# Patient Record
Sex: FEMALE | Race: BLACK OR AFRICAN AMERICAN | NOT HISPANIC OR LATINO | URBAN - METROPOLITAN AREA
[De-identification: names, ages, dates, MRNs, and addresses within clinical notes are randomized per-mention and may not be internally consistent; named-entity substitution may affect disease eponyms.]

---

## 2017-12-28 VITALS
DIASTOLIC BLOOD PRESSURE: 61 MMHG | TEMPERATURE: 99 F | OXYGEN SATURATION: 99 % | SYSTOLIC BLOOD PRESSURE: 136 MMHG | HEART RATE: 64 BPM | RESPIRATION RATE: 16 BRPM

## 2017-12-28 RX ORDER — CHLORHEXIDINE GLUCONATE 213 G/1000ML
1 SOLUTION TOPICAL ONCE
Qty: 0 | Refills: 0 | Status: DISCONTINUED | OUTPATIENT
Start: 2018-01-24 | End: 2018-01-25

## 2017-12-28 NOTE — H&P ADULT - PMH
Cataracts, bilateral    Dyslipidemia    Glaucoma    Hypertension    PVD (peripheral vascular disease)

## 2017-12-28 NOTE — H&P ADULT - HISTORY OF PRESENT ILLNESS
77 year old female with PMHx HTN, Dyslipidemia, PVD who presents c/o C/P at rest and with exertion.   Stress Echo 12/2017 revealed stress induced hypokinesia of the inferior and inferolateral wall suggestive of ischemia and poor exercise capacity. Resting Echo revealed normal LV size, normal LV wall motion, normal LVEF 55-60%, no resting wall motion abnormalities noted. 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     at rest and with exertion.     1-2/10 intermittent non-radiating chest discomfort lasting few minutes before resolving. relieved with belching.  SOB and fatigue with walking up hills. and climbing 1-2 flights of stairs. Patient reports intermittent dizziness and BLE ankle edema,but denies palpitations, diaphoresis, N/V, syncope, BLE ankle edema, PND or orthopnea.   Stress Echo 12/2017 revealed stress induced hypokinesia of the inferior and inferolateral wall suggestive of ischemia and poor exercise capacity. Resting Echo revealed normal LV size, normal LV wall motion, normal LVEF 55-60%, no resting wall motion abnormalities noted. Stress EKG revealed Sinus Tachycardia at 128 bpm (90% of maximum predicted HR), no ST or T wave changes suggestive of ischemia noted. Patient noted to have frequent PVCs and bigeminy at peak exercise, which resolved three minutes into recovery time. 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 C/P is described as 1-2/10 intermittent non-radiating chest discomfort at rest lasting few minutes before resolving (CCS Angina Class IV Symptoms). Upon further questioning patient admits to SOB and fatigue with walking up hills and climbing 1-2 flights of stairs relieved with rest. Patient reports intermittent dizziness and BLE ankle edema,but denies palpitations, diaphoresis, N/V, syncope, PND or orthopnea.   Stress Echo 12/2017 revealed stress induced hypokinesia of the inferior and inferolateral wall suggestive of ischemia and poor exercise capacity. Resting Echo revealed normal LV size, normal LV wall motion, normal LVEF 55-60%, no resting wall motion abnormalities noted. Stress EKG revealed Sinus Tachycardia at 128 bpm (90% of maximum predicted HR), no ST or T wave changes suggestive of ischemia noted. Patient noted to have frequent PVCs and bigeminy at peak exercise, which resolved three minutes into recovery time. In light of patient's risk factors, CCS Angina Class IV Symptoms and abnormal stress echo patient now presents for recommended cardiac cath with possible intervention. 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 C/P is described as 1-2/10 intermittent non-radiating chest discomfort at rest lasting few minutes before resolving (CCS Angina Class IV Symptoms). Upon further questioning patient admits to SOB and fatigue with walking up hills and climbing 1-2 flights of stairs relieved with rest. Patient reports intermittent dizziness and BLE ankle edema,but denies palpitations, diaphoresis, N/V, syncope, PND or orthopnea.   Stress Echo 12/2017 (per Dr. Delgado's office note) revealed stress induced hypokinesia of the inferior and inferolateral wall suggestive of ischemia and poor exercise capacity. Resting Echo revealed normal LV size, normal LV wall motion, normal LVEF 55-60%, no resting wall motion abnormalities noted. Stress EKG revealed Sinus Tachycardia at 128 bpm (90% of maximum predicted HR), no ST or T wave changes suggestive of ischemia noted. Patient noted to have frequent PVCs and bigeminy at peak exercise, which resolved three minutes into recovery time. In light of patient's risk factors, CCS Angina Class IV Symptoms and abnormal stress echo patient now presents for recommended cardiac cath with possible intervention. ***PT TO BRING IN ALL MEDS       77 y.o Female with FHx Early MI (Brother-30s) and Heart Transplant (Brother-30s) and PMHx HTN, Dyslipidemia, Gout, PVD s/p BLE stents who presented to cardiologist c/o C/P x 1-2 months. C/P is described as 1-2/10 intermittent non-radiating chest discomfort at rest lasting few minutes before resolving (CCS Angina Class IV Symptoms). Upon further questioning patient admits to SOB and fatigue with walking up hills and climbing 1-2 flights of stairs relieved with rest. Patient reports intermittent dizziness and BLE ankle edema,but denies palpitations, diaphoresis, N/V, syncope, PND or orthopnea.   Stress Echo 12/2017 (per Dr. Delgado's office note) revealed stress induced hypokinesia of the inferior and inferolateral wall suggestive of ischemia and poor exercise capacity. Resting Echo revealed normal LV size, normal LV wall motion, normal LVEF 55-60%, no resting wall motion abnormalities noted. Stress EKG revealed Sinus Tachycardia at 128 bpm (90% of maximum predicted HR), no ST or T wave changes suggestive of ischemia noted. Patient noted to have frequent PVCs and bigeminy at peak exercise, which resolved three minutes into recovery time. In light of patient's risk factors, CCS Angina Class IV Symptoms and abnormal stress echo patient now presents for recommended cardiac cath with possible intervention. 77 y.o Female with FHx Early MI (Brother-30s) and Heart Transplant (Brother-30s) and PMHx HTN, Dyslipidemia, Gout, PVD s/p BLE stents who presented to cardiologist c/o C/P x 1-2 months. C/P is described as 1-2/10 intermittent non-radiating chest discomfort at rest lasting few minutes before resolving (CCS Angina Class IV Symptoms). Upon further questioning patient admits to SOB and fatigue with walking up hills and climbing 1-2 flights of stairs relieved with rest. Patient reports intermittent dizziness and BLE ankle edema,but denies palpitations, diaphoresis, N/V, syncope, PND or orthopnea.   Stress Echo 12/2017 (per Dr. Delgado's office note) revealed stress induced hypokinesia of the inferior and inferolateral wall suggestive of ischemia and poor exercise capacity. Resting Echo revealed normal LV size, normal LV wall motion, normal LVEF 55-60%, no resting wall motion abnormalities noted. Stress EKG revealed Sinus Tachycardia at 128 bpm (90% of maximum predicted HR), no ST or T wave changes suggestive of ischemia noted. Patient noted to have frequent PVCs and bigeminy at peak exercise, which resolved three minutes into recovery time. In light of patient's risk factors, CCS Angina Class IV Symptoms and abnormal stress echo patient now presents for recommended cardiac cath with possible intervention.

## 2017-12-28 NOTE — H&P ADULT - PSH
History of ventral hernia repair    S/P bilateral breast lumpectomy  Benign  Status post glaucoma surgery

## 2017-12-28 NOTE — H&P ADULT - FAMILY HISTORY
Mother  Still living? No  Family history of myocardial infarction, Age at diagnosis: Age Unknown     Sibling  Still living? No  Family history of myocardial infarction, Age at diagnosis: 31-40

## 2017-12-28 NOTE — H&P ADULT - ASSESSMENT
77 y.o Female with FHx Early MI (Brother-30s) and Heart Transplant (Brother-30s) and PMHx HTN, Dyslipidemia, Gout, PVD s/p BLE stents who presented to cardiologist c/o CCS Class IV Anginal Symptoms in the setting of recent abnormal stress echo now referred for recommended Cardiac Catheterization with intervention if clinically indicated.    ASA  Mallampati    Risks & benefits of procedure and alternative therapy have been explained to the patient including but not limited to: allergic reaction, bleeding w/possible need for blood transfusion, infection, renal and vascular compromise, limb damage, arrhythmia, stroke, vessel dissection/perforation, Myocardial infarction, emergent CABG. Informed consent obtained and in chart. 77 y.o Female with FHx Early MI (Brother-30s) and Heart Transplant (Brother-30s) and PMHx HTN, Dyslipidemia, Gout, PVD s/p BLE stents who presented to cardiologist c/o CCS Class IV Anginal Symptoms in the setting of recent abnormal stress echo now referred for recommended Cardiac Catheterization with intervention if clinically indicated.    ASA II Mallampati II    Risks & benefits of procedure and alternative therapy have been explained to the patient including but not limited to: allergic reaction, bleeding w/possible need for blood transfusion, infection, renal and vascular compromise, limb damage, arrhythmia, stroke, vessel dissection/perforation, Myocardial infarction, emergent CABG. Informed consent obtained and in chart. 77 y.o Female with FHx Early MI (Brother-30s) and Heart Transplant (Brother-30s) and PMHx HTN, Dyslipidemia, Gout, PVD s/p BLE stents who presented to cardiologist c/o CCS Class IV Anginal Symptoms in the setting of recent abnormal stress echo now referred for recommended Cardiac Catheterization with intervention if clinically indicated.    ASA II Mallampati II      Risks & benefits of procedure and alternative therapy have been explained to the patient including but not limited to: allergic reaction, bleeding w/possible need for blood transfusion, infection, renal and vascular compromise, limb damage, arrhythmia, stroke, vessel dissection/perforation, Myocardial infarction, emergent CABG. Informed consent obtained and in chart. 77 y.o Female with FHx Early MI (Brother-30s) and Heart Transplant (Brother-30s) and PMHx HTN, Dyslipidemia, Gout, PVD s/p BLE stents who presented to cardiologist c/o CCS Class IV Anginal Symptoms in the setting of recent abnormal stress echo now referred for recommended Cardiac Catheterization with intervention if clinically indicated.    ASA II Mallampati II  WBC 12.1 on pre cath labs with no shift- pt afebrile with no infectious complaints    Risks & benefits of procedure and alternative therapy have been explained to the patient including but not limited to: allergic reaction, bleeding w/possible need for blood transfusion, infection, renal and vascular compromise, limb damage, arrhythmia, stroke, vessel dissection/perforation, Myocardial infarction, emergent CABG. Informed consent obtained and in chart. 77 y.o Female with FHx Early MI (Brother-30s) and Heart Transplant (Brother-30s) and PMHx HTN, Dyslipidemia, Gout, PVD s/p BLE stents who presented to cardiologist c/o CCS Class IV Anginal Symptoms in the setting of recent abnormal stress echo now referred for recommended Cardiac Catheterization with intervention if clinically indicated.    ASA II Mallampati II  WBC 12.1 on pre cath labs with no shift- pt afebrile with no infectious complaints  K+ 3.7 repleted pre cath with K-dur 40mEq PO x 1  ASA 325mg and Plavix 600mg given pre-procedure    Risks & benefits of procedure and alternative therapy have been explained to the patient including but not limited to: allergic reaction, bleeding w/possible need for blood transfusion, infection, renal and vascular compromise, limb damage, arrhythmia, stroke, vessel dissection/perforation, Myocardial infarction, emergent CABG. Informed consent obtained and in chart. 77 y.o Female with FHx Early MI (Brother-30s) and Heart Transplant (Brother-30s) and PMHx HTN, Dyslipidemia, Gout, PVD s/p BLE stents who presented to cardiologist c/o CCS Class IV Anginal Symptoms in the setting of recent abnormal stress echo now referred for recommended Cardiac Catheterization with intervention if clinically indicated.    ASA II Mallampati II  WBC 12.1 on pre cath labs with no shift- pt afebrile with no infectious complaints  K+ 3.7 repleted pre cath with K-dur 40mEq PO x 1  ASA 325mg and Plavix 600mg given pre-procedure  Of note: s/p K-dur/ASA/Plavix load- pt with 1 episode of non-bloody diarrhea self resolved as per nursing no further episodes- fellow Yared notified prior to procedure.    Risks & benefits of procedure and alternative therapy have been explained to the patient including but not limited to: allergic reaction, bleeding w/possible need for blood transfusion, infection, renal and vascular compromise, limb damage, arrhythmia, stroke, vessel dissection/perforation, Myocardial infarction, emergent CABG. Informed consent obtained and in chart.

## 2018-01-24 ENCOUNTER — INPATIENT (INPATIENT)
Facility: HOSPITAL | Age: 78
LOS: 0 days | Discharge: HOME | DRG: 247 | End: 2018-01-25
Attending: INTERNAL MEDICINE | Admitting: INTERNAL MEDICINE
Payer: MEDICARE

## 2018-01-24 DIAGNOSIS — Z98.83 FILTERING (VITREOUS) BLEB AFTER GLAUCOMA SURGERY STATUS: Chronic | ICD-10-CM

## 2018-01-24 DIAGNOSIS — Z98.890 OTHER SPECIFIED POSTPROCEDURAL STATES: Chronic | ICD-10-CM

## 2018-01-24 LAB
ALBUMIN SERPL ELPH-MCNC: 4.4 G/DL — SIGNIFICANT CHANGE UP (ref 3.3–5)
ALP SERPL-CCNC: 70 U/L — SIGNIFICANT CHANGE UP (ref 40–120)
ALT FLD-CCNC: 8 U/L — LOW (ref 10–45)
ANION GAP SERPL CALC-SCNC: 12 MMOL/L — SIGNIFICANT CHANGE UP (ref 5–17)
APTT BLD: 27.6 SEC — SIGNIFICANT CHANGE UP (ref 27.5–37.4)
AST SERPL-CCNC: 14 U/L — SIGNIFICANT CHANGE UP (ref 10–40)
BASOPHILS NFR BLD AUTO: 0.2 % — SIGNIFICANT CHANGE UP (ref 0–2)
BILIRUB DIRECT SERPL-MCNC: <0.2 MG/DL — SIGNIFICANT CHANGE UP (ref 0–0.2)
BILIRUB INDIRECT FLD-MCNC: >0.4 MG/DL — SIGNIFICANT CHANGE UP (ref 0.2–1)
BILIRUB SERPL-MCNC: 0.6 MG/DL — SIGNIFICANT CHANGE UP (ref 0.2–1.2)
BUN SERPL-MCNC: 20 MG/DL — SIGNIFICANT CHANGE UP (ref 7–23)
CALCIUM SERPL-MCNC: 10.6 MG/DL — HIGH (ref 8.4–10.5)
CHLORIDE SERPL-SCNC: 100 MMOL/L — SIGNIFICANT CHANGE UP (ref 96–108)
CHOLEST SERPL-MCNC: 248 MG/DL — HIGH (ref 10–199)
CK MB CFR SERPL CALC: 2.6 NG/ML — SIGNIFICANT CHANGE UP (ref 0–6.7)
CK SERPL-CCNC: 104 U/L — SIGNIFICANT CHANGE UP (ref 25–170)
CO2 SERPL-SCNC: 28 MMOL/L — SIGNIFICANT CHANGE UP (ref 22–31)
CREAT SERPL-MCNC: 0.97 MG/DL — SIGNIFICANT CHANGE UP (ref 0.5–1.3)
CRP SERPL-MCNC: 0.29 MG/DL — SIGNIFICANT CHANGE UP (ref 0–0.4)
EOSINOPHIL NFR BLD AUTO: 3.2 % — SIGNIFICANT CHANGE UP (ref 0–6)
GLUCOSE SERPL-MCNC: 102 MG/DL — HIGH (ref 70–99)
HBA1C BLD-MCNC: 4.9 % — SIGNIFICANT CHANGE UP (ref 4–5.6)
HCT VFR BLD CALC: 39.1 % — SIGNIFICANT CHANGE UP (ref 34.5–45)
HDLC SERPL-MCNC: 70 MG/DL — SIGNIFICANT CHANGE UP (ref 40–125)
HGB BLD-MCNC: 13.7 G/DL — SIGNIFICANT CHANGE UP (ref 11.5–15.5)
INR BLD: 0.93 — SIGNIFICANT CHANGE UP (ref 0.88–1.16)
LIPID PNL WITH DIRECT LDL SERPL: 143 MG/DL — HIGH
LYMPHOCYTES # BLD AUTO: 28.3 % — SIGNIFICANT CHANGE UP (ref 13–44)
MCHC RBC-ENTMCNC: 29.1 PG — SIGNIFICANT CHANGE UP (ref 27–34)
MCHC RBC-ENTMCNC: 35 G/DL — SIGNIFICANT CHANGE UP (ref 32–36)
MCV RBC AUTO: 83 FL — SIGNIFICANT CHANGE UP (ref 80–100)
MONOCYTES NFR BLD AUTO: 12 % — SIGNIFICANT CHANGE UP (ref 2–14)
NEUTROPHILS NFR BLD AUTO: 56.3 % — SIGNIFICANT CHANGE UP (ref 43–77)
PLATELET # BLD AUTO: 284 K/UL — SIGNIFICANT CHANGE UP (ref 150–400)
POTASSIUM SERPL-MCNC: 3.7 MMOL/L — SIGNIFICANT CHANGE UP (ref 3.5–5.3)
POTASSIUM SERPL-SCNC: 3.7 MMOL/L — SIGNIFICANT CHANGE UP (ref 3.5–5.3)
PROT SERPL-MCNC: 8.9 G/DL — HIGH (ref 6–8.3)
PROTHROM AB SERPL-ACNC: 10.3 SEC — SIGNIFICANT CHANGE UP (ref 9.8–12.7)
RBC # BLD: 4.71 M/UL — SIGNIFICANT CHANGE UP (ref 3.8–5.2)
RBC # FLD: 14.2 % — SIGNIFICANT CHANGE UP (ref 10.3–16.9)
SODIUM SERPL-SCNC: 140 MMOL/L — SIGNIFICANT CHANGE UP (ref 135–145)
TOTAL CHOLESTEROL/HDL RATIO MEASUREMENT: 3.5 RATIO — SIGNIFICANT CHANGE UP (ref 3.3–7.1)
TRIGL SERPL-MCNC: 173 MG/DL — HIGH (ref 10–149)
WBC # BLD: 12.1 K/UL — HIGH (ref 3.8–10.5)
WBC # FLD AUTO: 12.1 K/UL — HIGH (ref 3.8–10.5)

## 2018-01-24 PROCEDURE — 93454 CORONARY ARTERY ANGIO S&I: CPT | Mod: 26,XU

## 2018-01-24 PROCEDURE — 93010 ELECTROCARDIOGRAM REPORT: CPT

## 2018-01-24 PROCEDURE — 92928 PRQ TCAT PLMT NTRAC ST 1 LES: CPT | Mod: RC

## 2018-01-24 PROCEDURE — 93571 IV DOP VEL&/PRESS C FLO 1ST: CPT | Mod: 26,LD

## 2018-01-24 RX ORDER — CLOPIDOGREL BISULFATE 75 MG/1
75 TABLET, FILM COATED ORAL DAILY
Qty: 0 | Refills: 0 | Status: DISCONTINUED | OUTPATIENT
Start: 2018-01-25 | End: 2018-01-25

## 2018-01-24 RX ORDER — SIMVASTATIN 20 MG/1
40 TABLET, FILM COATED ORAL AT BEDTIME
Qty: 0 | Refills: 0 | Status: DISCONTINUED | OUTPATIENT
Start: 2018-01-24 | End: 2018-01-25

## 2018-01-24 RX ORDER — POTASSIUM CHLORIDE 20 MEQ
40 PACKET (EA) ORAL ONCE
Qty: 0 | Refills: 0 | Status: COMPLETED | OUTPATIENT
Start: 2018-01-24 | End: 2018-01-24

## 2018-01-24 RX ORDER — ASPIRIN/CALCIUM CARB/MAGNESIUM 324 MG
81 TABLET ORAL DAILY
Qty: 0 | Refills: 0 | Status: DISCONTINUED | OUTPATIENT
Start: 2018-01-25 | End: 2018-01-25

## 2018-01-24 RX ORDER — ALLOPURINOL 300 MG
100 TABLET ORAL DAILY
Qty: 0 | Refills: 0 | Status: DISCONTINUED | OUTPATIENT
Start: 2018-01-24 | End: 2018-01-25

## 2018-01-24 RX ORDER — SODIUM CHLORIDE 9 MG/ML
500 INJECTION INTRAMUSCULAR; INTRAVENOUS; SUBCUTANEOUS
Qty: 0 | Refills: 0 | Status: DISCONTINUED | OUTPATIENT
Start: 2018-01-24 | End: 2018-01-25

## 2018-01-24 RX ORDER — SODIUM CHLORIDE 9 MG/ML
500 INJECTION INTRAMUSCULAR; INTRAVENOUS; SUBCUTANEOUS
Qty: 0 | Refills: 0 | Status: DISCONTINUED | OUTPATIENT
Start: 2018-01-24 | End: 2018-01-24

## 2018-01-24 RX ORDER — METOPROLOL TARTRATE 50 MG
50 TABLET ORAL
Qty: 0 | Refills: 0 | Status: DISCONTINUED | OUTPATIENT
Start: 2018-01-24 | End: 2018-01-25

## 2018-01-24 RX ORDER — LISINOPRIL 2.5 MG/1
10 TABLET ORAL DAILY
Qty: 0 | Refills: 0 | Status: DISCONTINUED | OUTPATIENT
Start: 2018-01-25 | End: 2018-01-25

## 2018-01-24 RX ORDER — ASPIRIN/CALCIUM CARB/MAGNESIUM 324 MG
325 TABLET ORAL ONCE
Qty: 0 | Refills: 0 | Status: COMPLETED | OUTPATIENT
Start: 2018-01-24 | End: 2018-01-24

## 2018-01-24 RX ORDER — CILOSTAZOL 100 MG/1
50 TABLET ORAL
Qty: 0 | Refills: 0 | Status: DISCONTINUED | OUTPATIENT
Start: 2018-01-24 | End: 2018-01-25

## 2018-01-24 RX ORDER — AMLODIPINE BESYLATE 2.5 MG/1
5 TABLET ORAL DAILY
Qty: 0 | Refills: 0 | Status: DISCONTINUED | OUTPATIENT
Start: 2018-01-25 | End: 2018-01-25

## 2018-01-24 RX ORDER — CLOPIDOGREL BISULFATE 75 MG/1
600 TABLET, FILM COATED ORAL ONCE
Qty: 0 | Refills: 0 | Status: COMPLETED | OUTPATIENT
Start: 2018-01-24 | End: 2018-01-24

## 2018-01-24 RX ADMIN — SIMVASTATIN 40 MILLIGRAM(S): 20 TABLET, FILM COATED ORAL at 23:35

## 2018-01-24 RX ADMIN — Medication 325 MILLIGRAM(S): at 11:02

## 2018-01-24 RX ADMIN — CILOSTAZOL 50 MILLIGRAM(S): 100 TABLET ORAL at 23:35

## 2018-01-24 RX ADMIN — SODIUM CHLORIDE 75 MILLILITER(S): 9 INJECTION INTRAMUSCULAR; INTRAVENOUS; SUBCUTANEOUS at 11:01

## 2018-01-24 RX ADMIN — Medication 50 MILLIGRAM(S): at 23:35

## 2018-01-24 RX ADMIN — CLOPIDOGREL BISULFATE 600 MILLIGRAM(S): 75 TABLET, FILM COATED ORAL at 11:02

## 2018-01-24 RX ADMIN — Medication 40 MILLIEQUIVALENT(S): at 11:02

## 2018-01-25 VITALS
OXYGEN SATURATION: 99 % | DIASTOLIC BLOOD PRESSURE: 69 MMHG | HEART RATE: 80 BPM | RESPIRATION RATE: 16 BRPM | SYSTOLIC BLOOD PRESSURE: 158 MMHG

## 2018-01-25 LAB
ALBUMIN SERPL ELPH-MCNC: 3.7 G/DL — SIGNIFICANT CHANGE UP (ref 3.3–5)
ALP SERPL-CCNC: 57 U/L — SIGNIFICANT CHANGE UP (ref 40–120)
ALT FLD-CCNC: 7 U/L — LOW (ref 10–45)
ANION GAP SERPL CALC-SCNC: 11 MMOL/L — SIGNIFICANT CHANGE UP (ref 5–17)
AST SERPL-CCNC: 12 U/L — SIGNIFICANT CHANGE UP (ref 10–40)
BILIRUB SERPL-MCNC: 0.6 MG/DL — SIGNIFICANT CHANGE UP (ref 0.2–1.2)
BUN SERPL-MCNC: 19 MG/DL — SIGNIFICANT CHANGE UP (ref 7–23)
CALCIUM SERPL-MCNC: 9.7 MG/DL — SIGNIFICANT CHANGE UP (ref 8.4–10.5)
CHLORIDE SERPL-SCNC: 99 MMOL/L — SIGNIFICANT CHANGE UP (ref 96–108)
CO2 SERPL-SCNC: 27 MMOL/L — SIGNIFICANT CHANGE UP (ref 22–31)
CREAT SERPL-MCNC: 1.01 MG/DL — SIGNIFICANT CHANGE UP (ref 0.5–1.3)
GLUCOSE SERPL-MCNC: 94 MG/DL — SIGNIFICANT CHANGE UP (ref 70–99)
HCT VFR BLD CALC: 35.2 % — SIGNIFICANT CHANGE UP (ref 34.5–45)
HGB BLD-MCNC: 11.9 G/DL — SIGNIFICANT CHANGE UP (ref 11.5–15.5)
MAGNESIUM SERPL-MCNC: 1.9 MG/DL — SIGNIFICANT CHANGE UP (ref 1.6–2.6)
MCHC RBC-ENTMCNC: 28 PG — SIGNIFICANT CHANGE UP (ref 27–34)
MCHC RBC-ENTMCNC: 33.8 G/DL — SIGNIFICANT CHANGE UP (ref 32–36)
MCV RBC AUTO: 82.8 FL — SIGNIFICANT CHANGE UP (ref 80–100)
PLATELET # BLD AUTO: 277 K/UL — SIGNIFICANT CHANGE UP (ref 150–400)
POTASSIUM SERPL-MCNC: 4.4 MMOL/L — SIGNIFICANT CHANGE UP (ref 3.5–5.3)
POTASSIUM SERPL-SCNC: 4.4 MMOL/L — SIGNIFICANT CHANGE UP (ref 3.5–5.3)
PROT SERPL-MCNC: 7.3 G/DL — SIGNIFICANT CHANGE UP (ref 6–8.3)
RBC # BLD: 4.25 M/UL — SIGNIFICANT CHANGE UP (ref 3.8–5.2)
RBC # FLD: 14.3 % — SIGNIFICANT CHANGE UP (ref 10.3–16.9)
SODIUM SERPL-SCNC: 137 MMOL/L — SIGNIFICANT CHANGE UP (ref 135–145)
WBC # BLD: 12 K/UL — HIGH (ref 3.8–10.5)
WBC # FLD AUTO: 12 K/UL — HIGH (ref 3.8–10.5)

## 2018-01-25 PROCEDURE — 80053 COMPREHEN METABOLIC PANEL: CPT

## 2018-01-25 PROCEDURE — C1769: CPT

## 2018-01-25 PROCEDURE — 93005 ELECTROCARDIOGRAM TRACING: CPT

## 2018-01-25 PROCEDURE — C1725: CPT

## 2018-01-25 PROCEDURE — 82553 CREATINE MB FRACTION: CPT

## 2018-01-25 PROCEDURE — 85730 THROMBOPLASTIN TIME PARTIAL: CPT

## 2018-01-25 PROCEDURE — 85027 COMPLETE CBC AUTOMATED: CPT

## 2018-01-25 PROCEDURE — C1889: CPT

## 2018-01-25 PROCEDURE — 82550 ASSAY OF CK (CPK): CPT

## 2018-01-25 PROCEDURE — 83735 ASSAY OF MAGNESIUM: CPT

## 2018-01-25 PROCEDURE — 99238 HOSP IP/OBS DSCHRG MGMT 30/<: CPT

## 2018-01-25 PROCEDURE — 36415 COLL VENOUS BLD VENIPUNCTURE: CPT

## 2018-01-25 PROCEDURE — C1874: CPT

## 2018-01-25 PROCEDURE — C1887: CPT

## 2018-01-25 PROCEDURE — C1760: CPT

## 2018-01-25 PROCEDURE — C1894: CPT

## 2018-01-25 PROCEDURE — 80076 HEPATIC FUNCTION PANEL: CPT

## 2018-01-25 PROCEDURE — 83036 HEMOGLOBIN GLYCOSYLATED A1C: CPT

## 2018-01-25 PROCEDURE — 86140 C-REACTIVE PROTEIN: CPT

## 2018-01-25 PROCEDURE — 80048 BASIC METABOLIC PNL TOTAL CA: CPT

## 2018-01-25 PROCEDURE — 85025 COMPLETE CBC W/AUTO DIFF WBC: CPT

## 2018-01-25 PROCEDURE — 80061 LIPID PANEL: CPT

## 2018-01-25 PROCEDURE — 85610 PROTHROMBIN TIME: CPT

## 2018-01-25 RX ORDER — SIMVASTATIN 20 MG/1
1 TABLET, FILM COATED ORAL
Qty: 0 | Refills: 0 | COMMUNITY

## 2018-01-25 RX ORDER — ASPIRIN/CALCIUM CARB/MAGNESIUM 324 MG
1 TABLET ORAL
Qty: 90 | Refills: 2 | OUTPATIENT
Start: 2018-01-25 | End: 2018-10-21

## 2018-01-25 RX ORDER — ATORVASTATIN CALCIUM 80 MG/1
1 TABLET, FILM COATED ORAL
Qty: 30 | Refills: 2 | OUTPATIENT
Start: 2018-01-25 | End: 2018-04-24

## 2018-01-25 RX ORDER — ASPIRIN/CALCIUM CARB/MAGNESIUM 324 MG
1 TABLET ORAL
Qty: 0 | Refills: 0 | COMMUNITY

## 2018-01-25 RX ORDER — CLOPIDOGREL BISULFATE 75 MG/1
1 TABLET, FILM COATED ORAL
Qty: 30 | Refills: 11 | OUTPATIENT
Start: 2018-01-25 | End: 2019-01-19

## 2018-01-25 RX ADMIN — AMLODIPINE BESYLATE 5 MILLIGRAM(S): 2.5 TABLET ORAL at 06:59

## 2018-01-25 RX ADMIN — Medication 81 MILLIGRAM(S): at 12:43

## 2018-01-25 RX ADMIN — LISINOPRIL 10 MILLIGRAM(S): 2.5 TABLET ORAL at 06:59

## 2018-01-25 RX ADMIN — Medication 50 MILLIGRAM(S): at 06:59

## 2018-01-25 RX ADMIN — CLOPIDOGREL BISULFATE 75 MILLIGRAM(S): 75 TABLET, FILM COATED ORAL at 12:43

## 2018-01-25 RX ADMIN — Medication 100 MILLIGRAM(S): at 12:43

## 2018-01-25 RX ADMIN — CILOSTAZOL 50 MILLIGRAM(S): 100 TABLET ORAL at 06:59

## 2018-01-25 NOTE — CONSULT NOTE ADULT - SUBJECTIVE AND OBJECTIVE BOX
CHIEF COMPLAINT: CAD    HISTORY OF PRESENT ILLNESS:  76 yo Female with FHx Early MI (Brother-30s) and Heart Transplant (Brother-30s) and PMHx HTN, Dyslipidemia, Gout, PVD s/p BLE stents who presented to cardiologist c/o C/P x 1-2 months. C/P is described as 1-2/10 intermittent non-radiating chest discomfort at rest lasting few minutes before resolving (CCS Angina Class IV Symptoms). Upon further questioning patient admits to SOB and fatigue with walking up hills and climbing 1-2 flights of stairs relieved with rest. Patient reports intermittent dizziness and BLE ankle edema,but denies palpitations, diaphoresis, N/V, syncope, PND or orthopnea. Pt now s/p cardiac cath on 1/24/18 with KONRAD pRCA. FFR 0.78 mLAD. EF 55%. Patient was seen at the bedside to discuss prevention prior to discharge.     PAST MEDICAL & SURGICAL HISTORY:  Cataracts, bilateral  Glaucoma  PVD (peripheral vascular disease)  Dyslipidemia  Hypertension  Status post glaucoma surgery  History of ventral hernia repair  S/P bilateral breast lumpectomy: Benign    FAMILY HISTORY:   + brother with MI in his 30's and a brother with a heart transplant.     Allergies:   No Known Allergies      HOME MEDICATIONS:  · 	aspirin 81 mg oral tablet: 1 tab(s) orally once a day, Last Dose Taken:    · 	Metoprolol Tartrate 50 mg oral tablet: 1 tab(s) orally 2 times a day, Last Dose Taken:    · 	allopurinol 100 mg oral tablet: 1 tab(s) orally once a day, Last Dose Taken:    · 	simvastatin 40 mg oral tablet: 1 tab(s) orally once a day (at bedtime), Last Dose Taken:    · 	amlodipine-benazepril 5 mg-20 mg oral capsule: 1 cap(s) orally once a day, Last Dose Taken:    · 	chlorthalidone 25 mg oral tablet: 1 tab(s) orally once a day, Last Dose Taken:    · 	hydroCHLOROthiazide 12.5 mg oral capsule: orally every other day , Last Dose Taken:    · 	cilostazol 50 mg oral tablet: 1 tab(s) orally 2 times a day, Last Dose Taken:        PHYSICAL EXAM:  T(C): 37 (01-25-18 @ 09:23), Max: 37 (01-25-18 @ 09:23)  T(F): 98.6 (01-25-18 @ 09:23), Max: 98.6 (01-25-18 @ 09:23)  HR: 80 (01-25-18 @ 12:50) (65 - 82)  BP: 158/69 (01-25-18 @ 12:50) (109/54 - 158/69)  RR: 16 (01-25-18 @ 12:50) (16 - 16)  SpO2: 99% (01-25-18 @ 12:50) (98% - 99%)        LABS:	                        11.9   12.0  )-----------( 277      ( 25 Jan 2018 06:54 )             35.2     01-25    137  |  99  |  19  ----------------------------<  94  4.4   |  27  |  1.01    Ca    9.7      25 Jan 2018 06:54  Mg     1.9     01-25    TPro  7.3  /  Alb  3.7  /  TBili  0.6  /  DBili  x   /  AST  12  /  ALT  7<L>  /  AlkPhos  57  01-25    Cholesterol, Serum: 248 mg/dL (01-24 @ 10:05)  HDL Cholesterol, Serum: 70 mg/dL (01-24 @ 10:05)  Triglycerides, Serum: 173 mg/dL (01-24 @ 10:05)  Direct LDL: 143 mg/dL (01-24 @ 10:05)    C-Reactive Protein, Serum: 0.29 mg/dL (01-24 @ 10:05)    Hemoglobin A1C, Whole Blood: 4.9 % (01-24-18 @ 10:05)      10 "S" ASSESSMENT PLAN: SMOKING, SITTING, SUGAR, SALT, SOME FATS, SOCIAL, SLEEP, SIGNS, AND MEDS:  Tobacco usage: Denies.   Stress: Rates stress level as "pretty good".   ETOH: Denies.   Caffeine: herbal tea, 2-3 cups per day.   Hormone Replacement: Denies.   Sleep Disorder: + snores "when tired". She goes to bed at 9 and wakes up early at 3-4 am. She has not had a sleep study in the past.   Inflammatory Condition: Denies.   Activity Level: She walks to take her grandchild to  and to run errands. At least 16 blocks most days.  Current Diet: Breakfast) tea with toast and butter or eggs or grits with margarine. Lunch) leftovers from dinner, gloria, or chicken and noodles. Dinner) string beans/broccoli/mil greens, with chicken or fish and mashed potatoes or pasta. Snacks) cookies such as butter cookies or vanilla wafers.    Heart Failure: Denies signs or symptoms of congestion or history of CHF.  Myopathy with Statins: Denies.   GI/ Issues: She notes occasional constipation. She has had a colonoscopy in the past.   GYN: Patient said all of her children were born at term and she denies history of HTN while pregnant.     ASSESSMENT/RECOMMENDATIONS: 	  Summary: 76 yo Female with FHx Early MI (Brother-30s) and Heart Transplant (Brother-30s) and PMHx HTN, Dyslipidemia, Gout, PVD s/p BLE stents who presented to cardiologist c/o C/P x 1-2 months. C/P is described as 1-2/10 intermittent non-radiating chest discomfort at rest lasting few minutes before resolving (CCS Angina Class IV Symptoms). Upon further questioning patient admits to SOB and fatigue with walking up hills and climbing 1-2 flights of stairs relieved with rest. Patient reports intermittent dizziness and BLE ankle edema,but denies palpitations, diaphoresis, N/V, syncope, PND or orthopnea. Pt now s/p cardiac cath on 1/24/18 with KONRAD pRCA. FFR 0.78 mLAD. EF 55%. Patient was seen at the bedside to discuss prevention prior to discharge.     RECOMMENDATIONS:   Anti-platelet Therapy: DAPT per interventionalist recommendation asa/Pletal.   Lipid Therapy: Simvastatin was changed to Lipitor 80 mg/d. Monitoring LFT's 6-8 weeks after this change would likely benefit this patient.   Beta Blocker Therapy: Continue current therapy with metoprolol per your discretion.   ACE/ARB Therapy: Continue current therapy continue benazepril per your discretion.   Diet: Low-sodium, low-carbohydrate, Mediterranean diet. Written instruction on the Mediterranean diet was provided.   Exercise: 30-45 minutes most days of the week once cleared to do so by their referring cardiologist. Cardiac rehab might benefit this patient.   Smoking: This patient is a non-smoker.   Stress Management: Instruction on mindfulness meditation was provided.   Sleep: A sleep study might benefit this patient.     Thank you for the opportunity to see this patient. Please feel free to contact [facility] if there are any questions, or if you feel that your patient would benefit from continued follow-up visits with the Program.

## 2018-01-25 NOTE — DISCHARGE NOTE ADULT - MEDICATION SUMMARY - MEDICATIONS TO TAKE
I will START or STAY ON the medications listed below when I get home from the hospital:    Aspirin Enteric Coated 81 mg oral delayed release tablet  -- 1 tab(s) by mouth once a day   -- Indication: For Coronary artery disease    allopurinol 100 mg oral tablet  -- 1 tab(s) by mouth once a day  -- Indication: For Gout    atorvastatin 80 mg oral tablet  -- 1 tab(s) by mouth once a day (at bedtime)  -- Indication: For Coronary artery disease and dyslipidemia    amlodipine-benazepril 5 mg-20 mg oral capsule  -- 1 cap(s) by mouth once a day  -- Indication: For Hypertension    clopidogrel 75 mg oral tablet  -- 1 tab(s) by mouth once a day  -- Indication: For Coronary artery disease    Metoprolol Tartrate 50 mg oral tablet  -- 1 tab(s) by mouth 2 times a day  -- Indication: For Hypertension    chlorthalidone 25 mg oral tablet  -- 1 tab(s) by mouth once a day  -- Indication: For Coronary artery disease    cilostazol 50 mg oral tablet  -- 1 tab(s) by mouth 2 times a day  -- Indication: For PVD (peripheral vascular disease)

## 2018-01-25 NOTE — DISCHARGE NOTE ADULT - MEDICATION SUMMARY - MEDICATIONS TO STOP TAKING
I will STOP taking the medications listed below when I get home from the hospital:    simvastatin 40 mg oral tablet  -- 1 tab(s) by mouth once a day (at bedtime)    hydroCHLOROthiazide 12.5 mg oral capsule  -- orally every other day

## 2018-01-25 NOTE — DISCHARGE NOTE ADULT - PROVIDER TOKENS
FREE:[LAST:[Danny],FIRST:[Carlos],PHONE:[phone]],FAX:[phone]    -],ADDRESS:[[address] [place] [address]]]

## 2018-01-25 NOTE — DISCHARGE NOTE ADULT - CARE PLAN
Principal Discharge DX:	Coronary artery disease  Goal:	You have blockages in the blood flow to your heart. This is called Coronary Artery Disease. You had a cardiac catheterization and got a drug eluting stent to your proximal right coronary artery. You had a remaining blockage in your left anterior descending coronary artery that you will have to come back in 5 weeks to have another procedure and get more stents. It is VERY IMPORTANT that you take ASPIRIN mg once daily and PLAVIX (CLOPIDOGREL) 75mg once daily to keep your stent open. If you do not take them EVERY SINGLE DAY your stent can close and you can have a heart attack. Continue taking Simvastatin 40mg once daily at bedtime for cholesterol control.  Assessment and plan of treatment:	Right wrist and groin wound care: do not lift objects heavier than 5 lbs for 5 days. Observe for signs of bleeding including bleeding/sudden swelling to your right wrist/groin sites, new/worsening back pain, or numbness/tingling/pain/coolness to your right hand/wrist/forearm or right leg/foot/toes call your doctor and go to the nearest ED immediately and call your doctor. Follow up with Dr. Carlos Delgado in 1-2 weeks.  Secondary Diagnosis:	Hypertension  Goal:	Continue taking your blood pressure medications at home doses.  Secondary Diagnosis:	Dyslipidemia  Goal:	Continue taking Simvastatin 40mg once daily at bedtime for cholesterol control.  Secondary Diagnosis:	Glaucoma  Goal:	Continue taking medication for glaucoma Principal Discharge DX:	Coronary artery disease  Goal:	You have blockages in the blood flow to your heart. This is called Coronary Artery Disease. You had a cardiac catheterization and got a drug eluting stent to your proximal right coronary artery. You had a remaining blockage in your left anterior descending coronary artery that you will have to come back in 5 weeks to have another procedure and get more stents. It is VERY IMPORTANT that you take ASPIRIN mg once daily and PLAVIX (CLOPIDOGREL) 75mg once daily to keep your stent open. If you do not take them EVERY SINGLE DAY your stent can close and you can have a heart attack. STOP taking Simvastatin, you have been switched to Atorvastatin 80mg once daily at bedtime for cholesterol control and prevention of further atherosclerotic plaque formation in your arteries.  Assessment and plan of treatment:	Right wrist and groin wound care: do not lift objects heavier than 5 lbs for 5 days. Observe for signs of bleeding including bleeding/sudden swelling to your right wrist/groin sites, new/worsening back pain, or numbness/tingling/pain/coolness to your right hand/wrist/forearm or right leg/foot/toes call your doctor and go to the nearest ED immediately and call your doctor. Follow up with Dr. Carlos Delgado in 1-2 weeks.  Secondary Diagnosis:	Hypertension  Goal:	Continue taking your blood pressure medications at home doses.  Assessment and plan of treatment:	PLEASE STOP TAKING HYDROCHLOROTHIAZIDE.  Continue taking your home medication of Amlodipine-Benazepril 5-20mg once daily, Metoprolol 50mg twice daily, and Chlorthalidone 25mg once daily.  Secondary Diagnosis:	Dyslipidemia  Goal:	STOP taking SImvastatin 40mg daily; A new Prescription for Atorvastatin 80mg once daily at bedtime was sent to your pharmacy for cholesterol control.  Secondary Diagnosis:	Glaucoma  Goal:	Continue taking medication for glaucoma  Secondary Diagnosis:	PVD (peripheral vascular disease)  Assessment and plan of treatment:	Continue Cilostazol 50mg twice daily and resume regular walking program after 5days.

## 2018-01-25 NOTE — DISCHARGE NOTE ADULT - PLAN OF CARE
Continue taking Simvastatin 40mg once daily at bedtime for cholesterol control. Continue taking medication for glaucoma You have blockages in the blood flow to your heart. This is called Coronary Artery Disease. You had a cardiac catheterization and got a drug eluting stent to your proximal right coronary artery. You had a remaining blockage in your left anterior descending coronary artery that you will have to come back in 5 weeks to have another procedure and get more stents. It is VERY IMPORTANT that you take ASPIRIN mg once daily and PLAVIX (CLOPIDOGREL) 75mg once daily to keep your stent open. If you do not take them EVERY SINGLE DAY your stent can close and you can have a heart attack. Continue taking Simvastatin 40mg once daily at bedtime for cholesterol control. Right wrist and groin wound care: do not lift objects heavier than 5 lbs for 5 days. Observe for signs of bleeding including bleeding/sudden swelling to your right wrist/groin sites, new/worsening back pain, or numbness/tingling/pain/coolness to your right hand/wrist/forearm or right leg/foot/toes call your doctor and go to the nearest ED immediately and call your doctor. Follow up with Dr. Carlos Delgado in 1-2 weeks. Continue taking your blood pressure medications at home doses. You have blockages in the blood flow to your heart. This is called Coronary Artery Disease. You had a cardiac catheterization and got a drug eluting stent to your proximal right coronary artery. You had a remaining blockage in your left anterior descending coronary artery that you will have to come back in 5 weeks to have another procedure and get more stents. It is VERY IMPORTANT that you take ASPIRIN mg once daily and PLAVIX (CLOPIDOGREL) 75mg once daily to keep your stent open. If you do not take them EVERY SINGLE DAY your stent can close and you can have a heart attack. STOP taking Simvastatin, you have been switched to Atorvastatin 80mg once daily at bedtime for cholesterol control and prevention of further atherosclerotic plaque formation in your arteries. PLEASE STOP TAKING HYDROCHLOROTHIAZIDE.  Continue taking your home medication of Amlodipine-Benazepril 5-20mg once daily, Metoprolol 50mg twice daily, and Chlorthalidone 25mg once daily. STOP taking SImvastatin 40mg daily; A new Prescription for Atorvastatin 80mg once daily at bedtime was sent to your pharmacy for cholesterol control. Continue Cilostazol 50mg twice daily and resume regular walking program after 5days.

## 2018-01-25 NOTE — DISCHARGE NOTE ADULT - PATIENT PORTAL LINK FT
“You can access the [employer] Patient Portal, offered by [facility], by registering with the following website: [email or web address]”

## 2018-01-25 NOTE — DISCHARGE NOTE ADULT - HOSPITAL COURSE
78 yo Female with FHx Early MI (Brother-30s) and Heart Transplant (Brother-30s) and PMHx HTN, Dyslipidemia, Gout, PVD s/p BLE stents who presented to cardiologist c/o C/P x 1-2 months. C/P is described as 1-2/10 intermittent non-radiating chest discomfort at rest lasting few minutes before resolving (CCS Angina Class IV Symptoms). Upon further questioning patient admits to SOB and fatigue with walking up hills and climbing 1-2 flights of stairs relieved with rest. Patient reports intermittent dizziness and BLE ankle edema,but denies palpitations, diaphoresis, N/V, syncope, PND or orthopnea. Pt now s/p cardiac cath on 1/24/18 with KONRAD pRCA. FFR 0.78 mLAD. EF 55%. Right groin failed perclose, femstop placed for help with hemostasis.  at 530pm. Repeat 150s at 830pm. Femstop removed and pressure dressing applied. On 1/25/18 pt's labs stable. Vital signs stable.     Telemetry .....     Pt endorses...... Denies....  Pt stable for discharge as per Dr. Petre. Pt will follow up with Dr. Carlos Delgado in 1-2 weeks. Prescription for Plavix 75mg daily was sent to..... pharmacy. 76 yo Female with FHx Early MI (Brother-30s) and Heart Transplant (Brother-30s) and PMHx HTN, Dyslipidemia, Gout, PVD s/p BLE stents who presented to cardiologist c/o C/P x 1-2 months. C/P is described as 1-2/10 intermittent non-radiating chest discomfort at rest lasting few minutes before resolving (CCS Angina Class IV Symptoms). Upon further questioning patient admits to SOB and fatigue with walking up hills and climbing 1-2 flights of stairs relieved with rest. Patient reports intermittent dizziness and BLE ankle edema,but denies palpitations, diaphoresis, N/V, syncope, PND or orthopnea. Pt now s/p cardiac cath on 1/24/18 with KONRAD pRCA. FFR 0.78 mLAD. EF 55%. Right groin failed perclose, femstop placed for help with hemostasis. Femstop removed and pressure dressing applied. On 1/25/18 pt feels well, has ambulated and voided; all labs reviewed and are stable. Vital signs stable and no telemetry events. Rt groin and Rt Radial access sites soft without hematoma or bleed, no bruit, DP/PT pulses intact to baseline.      Pt stable for discharge as per Dr. Peter. Pt will follow up with Dr. Carlos Delgado in 1-2 weeks. Simvastatin changed to Atorvastatin 80mg qhs and HCTZ DC'd as pt already on Chlorthalidone.  Prescription for Plavix 75mg daily, Aspirin, and Atorvastatin was sent to pt's pharmacy.  All medications reviewed with pt, instructions signed.

## 2018-01-29 DIAGNOSIS — E78.5 HYPERLIPIDEMIA, UNSPECIFIED: ICD-10-CM

## 2018-01-29 DIAGNOSIS — I25.10 ATHEROSCLEROTIC HEART DISEASE OF NATIVE CORONARY ARTERY WITHOUT ANGINA PECTORIS: ICD-10-CM

## 2018-01-29 DIAGNOSIS — M10.9 GOUT, UNSPECIFIED: ICD-10-CM

## 2018-01-29 DIAGNOSIS — I10 ESSENTIAL (PRIMARY) HYPERTENSION: ICD-10-CM

## 2018-01-29 DIAGNOSIS — Z28.21 IMMUNIZATION NOT CARRIED OUT BECAUSE OF PATIENT REFUSAL: ICD-10-CM

## 2018-01-29 DIAGNOSIS — I73.9 PERIPHERAL VASCULAR DISEASE, UNSPECIFIED: ICD-10-CM

## 2018-01-29 DIAGNOSIS — Z79.82 LONG TERM (CURRENT) USE OF ASPIRIN: ICD-10-CM

## 2018-03-03 PROBLEM — H40.9 UNSPECIFIED GLAUCOMA: Chronic | Status: ACTIVE | Noted: 2017-12-28

## 2018-03-03 PROBLEM — E78.5 HYPERLIPIDEMIA, UNSPECIFIED: Chronic | Status: ACTIVE | Noted: 2017-12-28

## 2018-03-03 PROBLEM — I10 ESSENTIAL (PRIMARY) HYPERTENSION: Chronic | Status: ACTIVE | Noted: 2017-12-28

## 2018-03-03 PROBLEM — I73.9 PERIPHERAL VASCULAR DISEASE, UNSPECIFIED: Chronic | Status: ACTIVE | Noted: 2017-12-28

## 2018-03-03 PROBLEM — H26.9 UNSPECIFIED CATARACT: Chronic | Status: ACTIVE | Noted: 2017-12-28

## 2018-03-06 VITALS
RESPIRATION RATE: 18 BRPM | HEART RATE: 64 BPM | WEIGHT: 138.89 LBS | SYSTOLIC BLOOD PRESSURE: 154 MMHG | DIASTOLIC BLOOD PRESSURE: 71 MMHG | HEIGHT: 63 IN | TEMPERATURE: 98 F | OXYGEN SATURATION: 99 %

## 2018-03-06 NOTE — H&P ADULT - NSHPLABSRESULTS_GEN_ALL_CORE
13.0   9.6   )-----------( 268      ( 28 Mar 2018 13:11 )             38.7   PT/INR - ( 28 Mar 2018 13:11 )   PT: 10.8 sec;   INR: 0.97          PTT - ( 28 Mar 2018 13:11 )  PTT:29.7 sec   EKG: Sinus rhythm @ 64 BPm with 1st degree AV block, prolonged QT, LAE, LAD, Bifascicular Block, LVH, and Q waves in aVL 13.0   9.6   )-----------( 268      ( 28 Mar 2018 13:11 )             38.7   PT/INR - ( 28 Mar 2018 13:11 )   PT: 10.8 sec;   INR: 0.97          PTT - ( 28 Mar 2018 13:11 )  PTT:29.7 sec  03-28    142  |  104  |  16  ----------------------------<  86  4.1   |  28  |  0.87    Ca    10.1      28 Mar 2018 13:11    TPro  7.7  /  Alb  4.2  /  TBili  0.7  /  DBili  x   /  AST  15  /  ALT  15  /  AlkPhos  93  03-28       EKG: Sinus rhythm @ 64 BPm with 1st degree AV block, prolonged QT (pt on Pletal), LAE, LAD, Bifascicular Block, LVH, and Q waves in aVL

## 2018-03-06 NOTE — H&P ADULT - ASSESSMENT
Patient is a 76yo Female with FHx of Early MI (Brother-30s) and Heart Transplant (Brother-30s) and PMHx of HTN, HLD, Gout, PVD s/p BLE stents and CAD (most recent cath 1/24/18 @[facility] KONRAD x2 to proxRCA with residual midLAD lesion (FFR 0.78)) who presents for staged PCI of mLAD, in light of patient's risk factors, class IV anginal symptoms and known CAD with residual disease.      ASA: III   Mallampati: IV    Precath/consented  IVF NS @ 75 cc/hr  Pre-loaded at home with ASA 81 mg PO x 1 and Plavix 75 mg PO x 1 this AM. Pt reports strict compliance of ASA/Plavix. Patient is a 78yo Female with FHx of Early MI (Brother-30s) and Heart Transplant (Brother-30s) and PMHx of HTN, HLD, Gout, PVD s/p BLE stents and CAD (most recent cath 1/24/18 @[facility] KONRAD x2 to proxRCA with residual midLAD lesion (FFR 0.78) who presents for staged PCI of mLAD, in light of patient's risk factors, class IV anginal symptoms and known CAD with residual disease.      ASA: III   Mallampati: IV    Precath/consented  IVF NS @ 75 cc/hr  Pre-loaded at home with ASA 81 mg PO x 1 and Plavix 75 mg PO x 1 this AM. Pt reports strict compliance of ASA/Plavix.

## 2018-03-06 NOTE — H&P ADULT - HISTORY OF PRESENT ILLNESS
Patient is a 76yo F with FHx of Early MI (Brother-30s) and Heart Transplant (Brother-30s) and PMHx of HTN, HLD, Gout, PVD s/p BLE stents and CAD s/p PCI proxRCA 1/24/18 with residual SKELETON    Patient is a 78yo F with FHx of Early MI (Brother-30s) and Heart Transplant (Brother-30s) and PMHx of HTN, HLD, Gout, PVD s/p BLE stents and CAD s/p PCI proxRCA 1/24/18 with residual midLAD lesion (FFR 0.78) who presented to her cardiologist's office complaining of continued occasional chest discomfort. Patient denies fatigue, palpitations, PND, orthopnea, N/V, hematochezia, melena, LE edema, dizziness, syncope. Patient reports compliance with ASA/plavix.    In light of patient's risk factors, class III anginal symptoms and known CAD, patient is now referred for recommended staged PCI of mLAD. SKELETON    Patient is a 76yo F with FHx of Early MI (Brother-30s) and Heart Transplant (Brother-30s) and PMHx of HTN, HLD, Gout, PVD s/p BLE stents and CAD s/p PCI proxRCA 1/24/18 with residual midLAD lesion (FFR 0.78) who presented to her cardiologist's office complaining of continued occasional chest discomfort. Patient denies fatigue, palpitations, PND, orthopnea, N/V, hematochezia, melena, LE edema, dizziness, syncope. Patient reports compliance with ASA/plavix.    In light of patient's risk factors, class III anginal symptoms and known CAD, patient is now referred for recommended staged PCI of mLAD.    Cardiac Cath 1/24/18: KONRAD x 2 proxRCA, LM normal, midLAD 70% (FFR 0.78), LCX mild luminal irreg, right radial and right femoral access Confirm medications on arrival  ASA/Plavix as indicated.    Patient is a 78yo F extremely poor historian, with FHx of Early MI (Brother-30s) and Heart Transplant (Brother-30s) and PMHx of HTN, HLD, Gout, PVD s/p BLE stents and CAD (most recent cath 1/24/18 @[facility] KONRAD x2 to proxRCA with residual midLAD lesion (FFR 0.78)) who since her last procedure presented to her cardiologist's office complaining of continued occasional chest discomfort. She is unable to describe the quality of the pain and states that it occurs intermittently and occasionally at rest. Patient denies fatigue, palpitations, PND, orthopnea, N/V, hematochezia, melena, LE edema, dizziness, syncope. Patient reports compliance with ASA/plavix.  In light of patient's risk factors, class III anginal symptoms and known CAD, patient is now referred for recommended staged PCI of mLAD.    Cardiac Cath 1/24/18: KONRAD x 2 proxRCA, LM normal, midLAD 70% (FFR 0.78), LCX mild luminal irreg, right radial and right femoral access Patient is a 78yo F extremely poor historian, with FHx of Early MI (Brother-30s) and Heart Transplant (Brother-30s) and PMHx of HTN, HLD, Gout, PVD s/p BLE stents and CAD (most recent cath 1/24/18 @[facility] KONRAD x2 to proxRCA with residual midLAD lesion (FFR 0.78)) who since her last procedure presented to her cardiologist's office complaining of continued occasional chest discomfort. She is unable to describe the quality of the pain and states that it occurs intermittently and occasionally at rest. Patient denies fatigue, palpitations, PND, orthopnea, N/V, hematochezia, melena, LE edema, dizziness, syncope. Patient reports compliance with ASA/plavix.  Stress Echo 12/2017 (per Dr. Delgado's office note) revealed stress induced hypokinesia of the inferior and inferolateral wall suggestive of ischemia and poor exercise capacity. Resting Echo revealed normal LV size, normal LV wall motion, normal LVEF 55-60%, no resting wall motion abnormalities noted. Stress EKG revealed Sinus Tachycardia at 128 bpm (90% of maximum predicted HR), no ST or T wave changes suggestive of ischemia noted. Patient noted to have frequent PVCs and bigeminy at peak exercise, which resolved three minutes into recovery time.   In light of patient's risk factors, class IV anginal symptoms and known CAD with residual disease, patient is now referred for recommended staged PCI of mLAD.    Cardiac Cath 1/24/18: KONRAD x 2 proxRCA, LM normal, midLAD 70% (FFR 0.78), LCX mild luminal irreg, right radial and right femoral access Patient is a 78yo Female with FHx of Early MI (Brother-30s) and Heart Transplant (Brother-30s) and PMHx of HTN, HLD, Gout, PVD s/p BLE stents and CAD (most recent cath 1/24/18 @[facility] KONRAD x2 to proxRCA with residual midLAD lesion (FFR 0.78)) who since her last procedure presented to her cardiologist's office complaining of continued occasional chest discomfort. She is unable to describe the quality of the pain and states that it occurs intermittently and occasionally at rest. Patient denies fatigue, palpitations, PND, orthopnea, N/V, hematochezia, melena, LE edema, dizziness, syncope. Patient reports compliance with ASA/plavix.  Stress Echo 12/2017 (per Dr. Delgado's office note) revealed stress induced hypokinesia of the inferior and inferolateral wall suggestive of ischemia and poor exercise capacity. Resting Echo revealed normal LV size, normal LV wall motion, normal LVEF 55-60%, no resting wall motion abnormalities noted. Stress EKG revealed Sinus Tachycardia at 128 bpm (90% of maximum predicted HR), no ST or T wave changes suggestive of ischemia noted. Patient noted to have frequent PVCs and bigeminy at peak exercise, which resolved three minutes into recovery time.   In light of patient's risk factors, class IV anginal symptoms and known CAD with residual disease, patient is now referred for recommended staged PCI of mLAD.    Cardiac Cath 1/24/18: KONRAD x 2 proxRCA, LM normal, midLAD 70% (FFR 0.78), LCX mild luminal irreg, right radial and right femoral access Patient is a 76yo Female with FHx of Early MI (Brother-30s) and Heart Transplant (Brother-30s) and PMHx of HTN, HLD, Gout, PVD s/p BLE stents and CAD (most recent cath 1/24/18 @[facility] KONRAD x2 to proxRCA with residual midLAD lesion (FFR 0.78) who since her last procedure presented to her cardiologist's office complaining of continued occasional chest discomfort. She is unable to describe the quality of the pain and states that it occurs intermittently and occasionally at rest. Patient denies fatigue, palpitations, PND, orthopnea, N/V, hematochezia, melena, LE edema, dizziness, syncope. Patient reports compliance with ASA/plavix.  Stress Echo 12/2017 (per Dr. Delgado's office note) revealed stress induced hypokinesia of the inferior and inferolateral wall suggestive of ischemia and poor exercise capacity. Resting Echo revealed normal LV size, normal LV wall motion, normal LVEF 55-60%, no resting wall motion abnormalities noted. Stress EKG revealed Sinus Tachycardia at 128 bpm (90% of maximum predicted HR), no ST or T wave changes suggestive of ischemia noted. Patient noted to have frequent PVCs and bigeminy at peak exercise, which resolved three minutes into recovery time.   In light of patient's risk factors, class IV anginal symptoms and known CAD with residual disease, patient is now referred for recommended staged PCI of mLAD.    Cardiac Cath 1/24/18: KONRAD x 2 proxRCA, LM normal, midLAD 70% (FFR 0.78), LCX mild luminal irreg, right radial and right femoral access

## 2018-03-28 ENCOUNTER — INPATIENT (INPATIENT)
Facility: HOSPITAL | Age: 78
LOS: 0 days | Discharge: HOME | DRG: 247 | End: 2018-03-29
Attending: INTERNAL MEDICINE | Admitting: INTERNAL MEDICINE
Payer: MEDICARE

## 2018-03-28 DIAGNOSIS — Z98.890 OTHER SPECIFIED POSTPROCEDURAL STATES: Chronic | ICD-10-CM

## 2018-03-28 DIAGNOSIS — Z98.83 FILTERING (VITREOUS) BLEB AFTER GLAUCOMA SURGERY STATUS: Chronic | ICD-10-CM

## 2018-03-28 LAB
ALBUMIN SERPL ELPH-MCNC: 4.2 G/DL — SIGNIFICANT CHANGE UP (ref 3.3–5)
ALP SERPL-CCNC: 93 U/L — SIGNIFICANT CHANGE UP (ref 40–120)
ALT FLD-CCNC: 15 U/L — SIGNIFICANT CHANGE UP (ref 10–45)
ANION GAP SERPL CALC-SCNC: 10 MMOL/L — SIGNIFICANT CHANGE UP (ref 5–17)
APTT BLD: 29.7 SEC — SIGNIFICANT CHANGE UP (ref 27.5–37.4)
AST SERPL-CCNC: 15 U/L — SIGNIFICANT CHANGE UP (ref 10–40)
BASOPHILS NFR BLD AUTO: 0.3 % — SIGNIFICANT CHANGE UP (ref 0–2)
BILIRUB SERPL-MCNC: 0.7 MG/DL — SIGNIFICANT CHANGE UP (ref 0.2–1.2)
BUN SERPL-MCNC: 16 MG/DL — SIGNIFICANT CHANGE UP (ref 7–23)
CALCIUM SERPL-MCNC: 10.1 MG/DL — SIGNIFICANT CHANGE UP (ref 8.4–10.5)
CHLORIDE SERPL-SCNC: 104 MMOL/L — SIGNIFICANT CHANGE UP (ref 96–108)
CHOLEST SERPL-MCNC: 209 MG/DL — HIGH (ref 10–199)
CK MB CFR SERPL CALC: 2.4 NG/ML — SIGNIFICANT CHANGE UP (ref 0–6.7)
CK SERPL-CCNC: 104 U/L — SIGNIFICANT CHANGE UP (ref 25–170)
CO2 SERPL-SCNC: 28 MMOL/L — SIGNIFICANT CHANGE UP (ref 22–31)
CREAT SERPL-MCNC: 0.87 MG/DL — SIGNIFICANT CHANGE UP (ref 0.5–1.3)
CRP SERPL-MCNC: 0.25 MG/DL — SIGNIFICANT CHANGE UP (ref 0–0.4)
EOSINOPHIL NFR BLD AUTO: 2.6 % — SIGNIFICANT CHANGE UP (ref 0–6)
GLUCOSE SERPL-MCNC: 86 MG/DL — SIGNIFICANT CHANGE UP (ref 70–99)
HBA1C BLD-MCNC: 4.6 % — SIGNIFICANT CHANGE UP (ref 4–5.6)
HCT VFR BLD CALC: 38.7 % — SIGNIFICANT CHANGE UP (ref 34.5–45)
HDLC SERPL-MCNC: 78 MG/DL — SIGNIFICANT CHANGE UP (ref 40–125)
HGB BLD-MCNC: 13 G/DL — SIGNIFICANT CHANGE UP (ref 11.5–15.5)
INR BLD: 0.97 — SIGNIFICANT CHANGE UP (ref 0.88–1.16)
LIPID PNL WITH DIRECT LDL SERPL: 106 MG/DL — SIGNIFICANT CHANGE UP
LYMPHOCYTES # BLD AUTO: 30.8 % — SIGNIFICANT CHANGE UP (ref 13–44)
MCHC RBC-ENTMCNC: 29.1 PG — SIGNIFICANT CHANGE UP (ref 27–34)
MCHC RBC-ENTMCNC: 33.6 G/DL — SIGNIFICANT CHANGE UP (ref 32–36)
MCV RBC AUTO: 86.6 FL — SIGNIFICANT CHANGE UP (ref 80–100)
MONOCYTES NFR BLD AUTO: 10.3 % — SIGNIFICANT CHANGE UP (ref 2–14)
NEUTROPHILS NFR BLD AUTO: 56 % — SIGNIFICANT CHANGE UP (ref 43–77)
PLATELET # BLD AUTO: 268 K/UL — SIGNIFICANT CHANGE UP (ref 150–400)
POTASSIUM SERPL-MCNC: 4.1 MMOL/L — SIGNIFICANT CHANGE UP (ref 3.5–5.3)
POTASSIUM SERPL-SCNC: 4.1 MMOL/L — SIGNIFICANT CHANGE UP (ref 3.5–5.3)
PROT SERPL-MCNC: 7.7 G/DL — SIGNIFICANT CHANGE UP (ref 6–8.3)
PROTHROM AB SERPL-ACNC: 10.8 SEC — SIGNIFICANT CHANGE UP (ref 9.8–12.7)
RBC # BLD: 4.47 M/UL — SIGNIFICANT CHANGE UP (ref 3.8–5.2)
RBC # FLD: 14 % — SIGNIFICANT CHANGE UP (ref 10.3–16.9)
SODIUM SERPL-SCNC: 142 MMOL/L — SIGNIFICANT CHANGE UP (ref 135–145)
TOTAL CHOLESTEROL/HDL RATIO MEASUREMENT: 2.7 RATIO — LOW (ref 3.3–7.1)
TRIGL SERPL-MCNC: 123 MG/DL — SIGNIFICANT CHANGE UP (ref 10–149)
WBC # BLD: 9.6 K/UL — SIGNIFICANT CHANGE UP (ref 3.8–10.5)
WBC # FLD AUTO: 9.6 K/UL — SIGNIFICANT CHANGE UP (ref 3.8–10.5)

## 2018-03-28 PROCEDURE — 93010 ELECTROCARDIOGRAM REPORT: CPT

## 2018-03-28 PROCEDURE — 92928 PRQ TCAT PLMT NTRAC ST 1 LES: CPT | Mod: LD

## 2018-03-28 PROCEDURE — 93571 IV DOP VEL&/PRESS C FLO 1ST: CPT | Mod: 26,LD

## 2018-03-28 RX ORDER — NITROGLYCERIN 6.5 MG
1 CAPSULE, EXTENDED RELEASE ORAL
Qty: 0 | Refills: 0 | COMMUNITY

## 2018-03-28 RX ORDER — DORZOLAMIDE HYDROCHLORIDE TIMOLOL MALEATE 20; 5 MG/ML; MG/ML
1 SOLUTION/ DROPS OPHTHALMIC
Qty: 0 | Refills: 0 | COMMUNITY

## 2018-03-28 RX ORDER — METOPROLOL TARTRATE 50 MG
50 TABLET ORAL
Qty: 0 | Refills: 0 | Status: DISCONTINUED | OUTPATIENT
Start: 2018-03-28 | End: 2018-03-29

## 2018-03-28 RX ORDER — CILOSTAZOL 100 MG/1
1 TABLET ORAL
Qty: 0 | Refills: 0 | COMMUNITY

## 2018-03-28 RX ORDER — ATORVASTATIN CALCIUM 80 MG/1
80 TABLET, FILM COATED ORAL AT BEDTIME
Qty: 0 | Refills: 0 | Status: DISCONTINUED | OUTPATIENT
Start: 2018-03-28 | End: 2018-03-29

## 2018-03-28 RX ORDER — ASPIRIN/CALCIUM CARB/MAGNESIUM 324 MG
81 TABLET ORAL DAILY
Qty: 0 | Refills: 0 | Status: DISCONTINUED | OUTPATIENT
Start: 2018-03-29 | End: 2018-03-29

## 2018-03-28 RX ORDER — DORZOLAMIDE HYDROCHLORIDE TIMOLOL MALEATE 20; 5 MG/ML; MG/ML
1 SOLUTION/ DROPS OPHTHALMIC
Qty: 0 | Refills: 0 | Status: DISCONTINUED | OUTPATIENT
Start: 2018-03-28 | End: 2018-03-29

## 2018-03-28 RX ORDER — AMLODIPINE BESYLATE AND BENAZEPRIL HYDROCHLORIDE 10; 20 MG/1; MG/1
1 CAPSULE ORAL
Qty: 0 | Refills: 0 | COMMUNITY

## 2018-03-28 RX ORDER — CHLORTHALIDONE 50 MG
1 TABLET ORAL
Qty: 0 | Refills: 0 | COMMUNITY

## 2018-03-28 RX ORDER — CILOSTAZOL 100 MG/1
50 TABLET ORAL EVERY 12 HOURS
Qty: 0 | Refills: 0 | Status: DISCONTINUED | OUTPATIENT
Start: 2018-03-28 | End: 2018-03-29

## 2018-03-28 RX ORDER — CLOPIDOGREL BISULFATE 75 MG/1
75 TABLET, FILM COATED ORAL DAILY
Qty: 0 | Refills: 0 | Status: DISCONTINUED | OUTPATIENT
Start: 2018-03-29 | End: 2018-03-29

## 2018-03-28 RX ORDER — ALLOPURINOL 300 MG
1 TABLET ORAL
Qty: 0 | Refills: 0 | COMMUNITY

## 2018-03-28 RX ORDER — AMLODIPINE BESYLATE 2.5 MG/1
5 TABLET ORAL DAILY
Qty: 0 | Refills: 0 | Status: DISCONTINUED | OUTPATIENT
Start: 2018-03-28 | End: 2018-03-29

## 2018-03-28 RX ORDER — SODIUM CHLORIDE 9 MG/ML
500 INJECTION INTRAMUSCULAR; INTRAVENOUS; SUBCUTANEOUS
Qty: 0 | Refills: 0 | Status: DISCONTINUED | OUTPATIENT
Start: 2018-03-28 | End: 2018-03-29

## 2018-03-28 RX ORDER — CHLORHEXIDINE GLUCONATE 213 G/1000ML
1 SOLUTION TOPICAL ONCE
Qty: 0 | Refills: 0 | Status: DISCONTINUED | OUTPATIENT
Start: 2018-03-28 | End: 2018-03-28

## 2018-03-28 RX ORDER — ALLOPURINOL 300 MG
100 TABLET ORAL DAILY
Qty: 0 | Refills: 0 | Status: DISCONTINUED | OUTPATIENT
Start: 2018-03-28 | End: 2018-03-29

## 2018-03-28 RX ORDER — SODIUM CHLORIDE 9 MG/ML
500 INJECTION INTRAMUSCULAR; INTRAVENOUS; SUBCUTANEOUS
Qty: 0 | Refills: 0 | Status: DISCONTINUED | OUTPATIENT
Start: 2018-03-28 | End: 2018-03-28

## 2018-03-28 RX ORDER — METOPROLOL TARTRATE 50 MG
1 TABLET ORAL
Qty: 0 | Refills: 0 | COMMUNITY

## 2018-03-28 RX ORDER — LISINOPRIL 2.5 MG/1
20 TABLET ORAL DAILY
Qty: 0 | Refills: 0 | Status: DISCONTINUED | OUTPATIENT
Start: 2018-03-28 | End: 2018-03-29

## 2018-03-28 RX ADMIN — DORZOLAMIDE HYDROCHLORIDE TIMOLOL MALEATE 1 DROP(S): 20; 5 SOLUTION/ DROPS OPHTHALMIC at 22:26

## 2018-03-28 RX ADMIN — ATORVASTATIN CALCIUM 80 MILLIGRAM(S): 80 TABLET, FILM COATED ORAL at 22:26

## 2018-03-28 RX ADMIN — SODIUM CHLORIDE 75 MILLILITER(S): 9 INJECTION INTRAMUSCULAR; INTRAVENOUS; SUBCUTANEOUS at 14:24

## 2018-03-28 RX ADMIN — Medication 50 MILLIGRAM(S): at 19:27

## 2018-03-28 RX ADMIN — CILOSTAZOL 50 MILLIGRAM(S): 100 TABLET ORAL at 22:26

## 2018-03-29 VITALS
OXYGEN SATURATION: 98 % | SYSTOLIC BLOOD PRESSURE: 144 MMHG | RESPIRATION RATE: 16 BRPM | HEART RATE: 66 BPM | DIASTOLIC BLOOD PRESSURE: 63 MMHG

## 2018-03-29 LAB
ANION GAP SERPL CALC-SCNC: 8 MMOL/L — SIGNIFICANT CHANGE UP (ref 5–17)
BUN SERPL-MCNC: 13 MG/DL — SIGNIFICANT CHANGE UP (ref 7–23)
CALCIUM SERPL-MCNC: 9.9 MG/DL — SIGNIFICANT CHANGE UP (ref 8.4–10.5)
CHLORIDE SERPL-SCNC: 103 MMOL/L — SIGNIFICANT CHANGE UP (ref 96–108)
CO2 SERPL-SCNC: 28 MMOL/L — SIGNIFICANT CHANGE UP (ref 22–31)
CREAT SERPL-MCNC: 0.84 MG/DL — SIGNIFICANT CHANGE UP (ref 0.5–1.3)
GLUCOSE SERPL-MCNC: 101 MG/DL — HIGH (ref 70–99)
HCT VFR BLD CALC: 37.8 % — SIGNIFICANT CHANGE UP (ref 34.5–45)
HGB BLD-MCNC: 12.6 G/DL — SIGNIFICANT CHANGE UP (ref 11.5–15.5)
MAGNESIUM SERPL-MCNC: 1.8 MG/DL — SIGNIFICANT CHANGE UP (ref 1.6–2.6)
MCHC RBC-ENTMCNC: 28.2 PG — SIGNIFICANT CHANGE UP (ref 27–34)
MCHC RBC-ENTMCNC: 33.3 G/DL — SIGNIFICANT CHANGE UP (ref 32–36)
MCV RBC AUTO: 84.6 FL — SIGNIFICANT CHANGE UP (ref 80–100)
PLATELET # BLD AUTO: 296 K/UL — SIGNIFICANT CHANGE UP (ref 150–400)
POTASSIUM SERPL-MCNC: 3.7 MMOL/L — SIGNIFICANT CHANGE UP (ref 3.5–5.3)
POTASSIUM SERPL-SCNC: 3.7 MMOL/L — SIGNIFICANT CHANGE UP (ref 3.5–5.3)
RBC # BLD: 4.47 M/UL — SIGNIFICANT CHANGE UP (ref 3.8–5.2)
RBC # FLD: 13.8 % — SIGNIFICANT CHANGE UP (ref 10.3–16.9)
SODIUM SERPL-SCNC: 139 MMOL/L — SIGNIFICANT CHANGE UP (ref 135–145)
WBC # BLD: 9.2 K/UL — SIGNIFICANT CHANGE UP (ref 3.8–10.5)
WBC # FLD AUTO: 9.2 K/UL — SIGNIFICANT CHANGE UP (ref 3.8–10.5)

## 2018-03-29 PROCEDURE — 80061 LIPID PANEL: CPT

## 2018-03-29 PROCEDURE — 85025 COMPLETE CBC W/AUTO DIFF WBC: CPT

## 2018-03-29 PROCEDURE — 86140 C-REACTIVE PROTEIN: CPT

## 2018-03-29 PROCEDURE — C1725: CPT

## 2018-03-29 PROCEDURE — 83735 ASSAY OF MAGNESIUM: CPT

## 2018-03-29 PROCEDURE — 83036 HEMOGLOBIN GLYCOSYLATED A1C: CPT

## 2018-03-29 PROCEDURE — C1760: CPT

## 2018-03-29 PROCEDURE — 85730 THROMBOPLASTIN TIME PARTIAL: CPT

## 2018-03-29 PROCEDURE — 85610 PROTHROMBIN TIME: CPT

## 2018-03-29 PROCEDURE — C1769: CPT

## 2018-03-29 PROCEDURE — C1889: CPT

## 2018-03-29 PROCEDURE — C1887: CPT

## 2018-03-29 PROCEDURE — 80048 BASIC METABOLIC PNL TOTAL CA: CPT

## 2018-03-29 PROCEDURE — 36415 COLL VENOUS BLD VENIPUNCTURE: CPT

## 2018-03-29 PROCEDURE — C1874: CPT

## 2018-03-29 PROCEDURE — 99239 HOSP IP/OBS DSCHRG MGMT >30: CPT

## 2018-03-29 PROCEDURE — 80053 COMPREHEN METABOLIC PANEL: CPT

## 2018-03-29 PROCEDURE — 93010 ELECTROCARDIOGRAM REPORT: CPT

## 2018-03-29 PROCEDURE — 82550 ASSAY OF CK (CPK): CPT

## 2018-03-29 PROCEDURE — 85027 COMPLETE CBC AUTOMATED: CPT

## 2018-03-29 PROCEDURE — 93005 ELECTROCARDIOGRAM TRACING: CPT

## 2018-03-29 PROCEDURE — 82553 CREATINE MB FRACTION: CPT

## 2018-03-29 PROCEDURE — C1894: CPT

## 2018-03-29 RX ORDER — CLOPIDOGREL BISULFATE 75 MG/1
1 TABLET, FILM COATED ORAL
Qty: 30 | Refills: 11 | OUTPATIENT
Start: 2018-03-29 | End: 2019-03-23

## 2018-03-29 RX ORDER — MAGNESIUM OXIDE 400 MG ORAL TABLET 241.3 MG
100 TABLET ORAL ONCE
Qty: 0 | Refills: 0 | Status: DISCONTINUED | OUTPATIENT
Start: 2018-03-29 | End: 2018-03-29

## 2018-03-29 RX ORDER — ASPIRIN/CALCIUM CARB/MAGNESIUM 324 MG
1 TABLET ORAL
Qty: 30 | Refills: 11 | OUTPATIENT
Start: 2018-03-29 | End: 2019-03-23

## 2018-03-29 RX ORDER — MAGNESIUM OXIDE 400 MG ORAL TABLET 241.3 MG
200 TABLET ORAL ONCE
Qty: 0 | Refills: 0 | Status: DISCONTINUED | OUTPATIENT
Start: 2018-03-29 | End: 2018-03-29

## 2018-03-29 RX ORDER — POTASSIUM CHLORIDE 20 MEQ
20 PACKET (EA) ORAL ONCE
Qty: 0 | Refills: 0 | Status: COMPLETED | OUTPATIENT
Start: 2018-03-29 | End: 2018-03-29

## 2018-03-29 RX ADMIN — Medication 100 MILLIGRAM(S): at 10:16

## 2018-03-29 RX ADMIN — LISINOPRIL 20 MILLIGRAM(S): 2.5 TABLET ORAL at 05:38

## 2018-03-29 RX ADMIN — Medication 50 MILLIGRAM(S): at 05:38

## 2018-03-29 RX ADMIN — CLOPIDOGREL BISULFATE 75 MILLIGRAM(S): 75 TABLET, FILM COATED ORAL at 10:16

## 2018-03-29 RX ADMIN — Medication 81 MILLIGRAM(S): at 10:16

## 2018-03-29 RX ADMIN — CILOSTAZOL 50 MILLIGRAM(S): 100 TABLET ORAL at 10:22

## 2018-03-29 RX ADMIN — DORZOLAMIDE HYDROCHLORIDE TIMOLOL MALEATE 1 DROP(S): 20; 5 SOLUTION/ DROPS OPHTHALMIC at 05:38

## 2018-03-29 RX ADMIN — AMLODIPINE BESYLATE 5 MILLIGRAM(S): 2.5 TABLET ORAL at 05:38

## 2018-03-29 RX ADMIN — Medication 20 MILLIEQUIVALENT(S): at 10:16

## 2018-03-29 NOTE — DISCHARGE NOTE ADULT - HOSPITAL COURSE
78yo Female with FHx of Early MI (Brother-30s) and Heart Transplant (Brother-30s) and PMHx of HTN, HLD, Gout, PVD s/p BLE stents and CAD (most recent cath 1/24/18 @[facility] KONRAD x2 to proxRCA with residual midLAD lesion (FFR 0.78) who since her last procedure presented to her cardiologist's office complaining of continued occasional chest discomfort. She is unable to describe the quality of the pain and states that it occurs intermittently and occasionally at rest. Patient denies fatigue, palpitations, PND, orthopnea, N/V, hematochezia, melena, LE edema, dizziness, syncope. Patient reports compliance with ASA/plavix. Stress Echo 12/2017 (per Dr. Delgado's office note) revealed stress induced hypokinesia of the inferior and inferolateral wall suggestive of ischemia and poor exercise capacity. Resting Echo revealed normal LV size, normal LV wall motion, normal LVEF 55-60%, no resting wall motion abnormalities noted. Stress EKG revealed Sinus Tachycardia at 128 bpm (90% of maximum predicted HR), no ST or T wave changes suggestive of ischemia noted. Patient noted to have frequent PVCs and bigeminy at peak exercise, which resolved three minutes into recovery time. In light of patient's risk factors, class IV anginal symptoms and known CAD with residual disease, patient is now referred for recommended staged PCI of mLAD. Cardiac Cath 1/24/18: KONRAD x 2 proxRCA, LM normal, midLAD 70% (FFR 0.78), LCX mild luminal irreg, right radial and right femoral access. Pt. s/p Cath 3/28; KONRAD  to mLAD, RFA AS, nl EF. Pt. seen and examined at bedside this am. Pt. denies any CP, SOB, dizziness and palpitations, R groin access site stable, no bleeding and hematoma noted. VSS, Labs stable  except for K 3.7, pt. supplemented with Kdur 30 meq PO X 1. . Home meds reviwed with Dr. Corbin and pt. to be d/c on aspirin 81 mg daily, plavix 75 mg daily, metoprolol tartate 50 mg two times daily, lisinopril 20 mg daily, amlodipine 5 mg daily and atorvastatin 80 mg daily.  Pt. stable to be d/c as per Dr. Corbin and to f/u with Dr. Gonzalez in 1-2 weeks.

## 2018-03-29 NOTE — PROVIDER CONTACT NOTE (OTHER) - ASSESSMENT
Pt A&Ox4. VSS. OOB ind. Right groin benign. PIVL and tele removed. Pt instructed to wait for PA to come to bedside to review DC paperwork. Pt left with daughter w/out receiving DC papers.

## 2018-03-29 NOTE — DISCHARGE NOTE ADULT - PATIENT PORTAL LINK FT
You can access the [facility], offered by [facility], by registering with the following website: [email or web address]

## 2018-03-29 NOTE — CONSULT NOTE ADULT - SUBJECTIVE AND OBJECTIVE BOX
CHIEF COMPLAINT: CAD    HISTORY OF PRESENT ILLNESS:  76 yo Female with FHx Early MI (Brother-30s) and Heart Transplant (Brother-30s) and PMHx HTN, Dyslipidemia, Gout, PVD s/p BLE stents who presented to cardiologist c/o C/P x 1-2 months. C/P is described as 1-2/10 intermittent non-radiating chest discomfort at rest lasting few minutes before resolving (CCS Angina Class IV Symptoms). Upon further questioning patient admits to SOB and fatigue with walking up hills and climbing 1-2 flights of stairs relieved with rest. Patient reports intermittent dizziness and BLE ankle edema,but denies palpitations, diaphoresis, N/V, syncope, PND or orthopnea. Pt now s/p cardiac cath on 18 with KONRAD pRCA. FFR 0.78 mLAD. EF 55%. Right groin failed perclose, femstop placed for help with hemostasis. Femstop removed and pressure dressing applied. On 18 pt feels well, has ambulated and voided; all labs reviewed and are stable. Vital signs stable and no telemetry events. Rt groin and Rt Radial access sites soft without hematoma or bleed, no bruit, DP/PT pulses intact to baseline.      Pt stable for discharge as per Dr. Peter. Pt will follow up with Dr. Carlos Delgado in 1-2 weeks. Simvastatin changed to Atorvastatin 80mg qhs and HCTZ DC'd as pt already on Chlorthalidone.  Prescription for Plavix 75mg daily, Aspirin, and Atorvastatin was sent to pt's pharmacy.  All medications reviewed with pt, instructions signed.  Patient was seen at the bedside prior to discharge to discuss secondary prevention.     PAST MEDICAL & SURGICAL HISTORY:  Cataracts, bilateral  Glaucoma  PVD (peripheral vascular disease)  Dyslipidemia  Hypertension  Status post glaucoma surgery  History of ventral hernia repair  S/P bilateral breast lumpectomy: Benign    FAMILY HISTORY:   Brother with early MI and heart transplant.     Allergies:   No Known Allergies    HOME MEDICATIONS:  · 	atorvastatin 80 mg oral tablet: 1 tab(s) orally once a day (at bedtime), Last Dose Taken:    · 	clopidogrel 75 mg oral tablet: 1 tab(s) orally once a day, Last Dose Taken:    · 	Aspirin Enteric Coated 81 mg oral delayed release tablet: 1 tab(s) orally once a day , Last Dose Taken:    · 	nitroglycerin 0.4 mg sublingual tablet: 1 tab(s) sublingual every 5 minutes  · 	dorzolamide-timolol 2.23%-0.68% ophthalmic solution: 1 drop(s) to each affected eye 2 times a day  · 	Metoprolol Tartrate 50 mg oral tablet: 1 tab(s) orally 2 times a day, Last Dose Taken:    · 	allopurinol 100 mg oral tablet: 1 tab(s) orally once a day, Last Dose Taken:    · 	amlodipine-benazepril 5 mg-20 mg oral capsule: 1 cap(s) orally once a day, Last Dose Taken:    · 	cilostazol 50 mg oral tablet: 1 tab(s) orally 2 times a day, Last Dose Taken:      PHYSICAL EXAM:  T(C): 36.5 (18 @ 10:30), Max: 36.7 (18 @ 05:41)  T(F): 97.7 (18 @ 10:30), Max: 98 (18 @ 05:41)  HR: 64 (18 @ 10:20) (56 - 64)  BP: 129/60 (18 @ 10:20) (108/54 - 165/71)  RR: 16 (18 @ 10:20) (16 - 16)  SpO2: 98% (18 @ 10:20) (98% - 99%)  Height (cm): 160.02 ( @ 13:57)  Weight (kg): 63 ( @ 13:57)  BMI (kg/m2): 24.6 ( @ 13:57)  	  LABS:	                        12.6   9.2   )-----------( 296      ( 29 Mar 2018 08:07 )             37.8         139  |  103  |  13  ----------------------------<  101<H>  3.7   |  28  |  0.84    Ca    9.9      29 Mar 2018 08:07  Mg     1.8         TPro  7.7  /  Alb  4.2  /  TBili  0.7  /  DBili  x   /  AST  15  /  ALT  15  /  AlkPhos  93        Hemoglobin A1C, Whole Blood: 4.6 % ( @ 13:11)    Cholesterol, Serum: 209 mg/dL ( @ 13:11)  HDL Cholesterol, Serum: 78 mg/dL ( @ 13:11)  Triglycerides, Serum: 123 mg/dL ( @ 13:11)  Direct LDL: 106 mg/dL ( @ 13:11)    C-Reactive Protein, Serum: 0.25 mg/dL ( @ 13:11)    10 "S" ASSESSMENT PLAN: SMOKING, SITTING, SUGAR, SALT, SOME FATS, SOCIAL, SLEEP, SIGNS, AND MEDS:  Tobacco usage: Denies.   ETOH: Denies.   Stress: Rates stress as moderate.   Caffeine: Denies.   Hormone Replacement: Denies.   Sleep Disorder: Denies snoring and says sleep is restorative.   Inflammatory Condition: + RA  Activity Level: She walks her grandchild to school 20 blocks each day.   Heart Failure: Denies history of CHF or s/s of congestion.   Myopathy with Statins: Denies.   GI/ Issues: Denies.   GYN: Denies history of  deliveries or hypertension while pregnant.   Migraines: Denies.  Current Diet: Breakfast) cereal (corn flakes or cheeries) with 1% milk and banana or tea and toast. Lunch) often skips or just snacks on crackers. Dinner) baked chicken with string beans and a sweet potato and bread. Dessert) applesauce.     ASSESSMENT/RECOMMENDATIONS: 	  Summary: 76 yo Female with FHx Early MI (Brother-30s) and Heart Transplant (Brother-30s) and PMHx HTN, Dyslipidemia, Gout, PVD s/p BLE stents who presented to cardiologist c/o C/P x 1-2 months. C/P is described as 1-2/10 intermittent non-radiating chest discomfort at rest lasting few minutes before resolving (CCS Angina Class IV Symptoms). Upon further questioning patient admits to SOB and fatigue with walking up hills and climbing 1-2 flights of stairs relieved with rest. Patient reports intermittent dizziness and BLE ankle edema,but denies palpitations, diaphoresis, N/V, syncope, PND or orthopnea. Pt now s/p cardiac cath on 18 with KONRAD pRCA. FFR 0.78 mLAD. EF 55%. Right groin failed perclose, femstop placed for help with hemostasis. Femstop removed and pressure dressing applied. On 18 pt feels well, has ambulated and voided; all labs reviewed and are stable. Vital signs stable and no telemetry events. Rt groin and Rt Radial access sites soft without hematoma or bleed, no bruit, DP/PT pulses intact to baseline.      Pt stable for discharge as per Dr. Peter. Pt will follow up with Dr. Carlos Delgado in 1-2 weeks. Simvastatin changed to Atorvastatin 80mg qhs and HCTZ DC'd as pt already on Chlorthalidone.  Prescription for Plavix 75mg daily, Aspirin, and Atorvastatin was sent to pt's pharmacy.  All medications reviewed with pt, instructions signed.  Patient was seen at the bedside prior to discharge to discuss secondary prevention.       RECOMMENDATIONS:   Anti-platelet Therapy: DAPT per interventionalist recommendation. Consider d/c'ing Pletal and managing PVD with asa/Plavix to reduce risk of bleeding.   Lipid Therapy: High dose statin therapy would likely benefit this patient. Patient is currently on Lipitor 80 mg/d and LDL remains above goal at 106. Consider adding Zetia to further reduce LDL cholesterol in this patient.   Beta Blocker Therapy: Continue current therapy with metoprolol per your discretion.   ACE/ARB Therapy: Continue current therapy with benazepril per your discretion.   Diet: Low-sodium, low-carbohydrate, Mediterranean diet. Written instruction on the Mediterranean diet was provided.   Exercise: 30-45 minutes most days of the week once cleared to do so by their referring cardiologist.   Smoking: This patient is a non-smoker.   Stress Management: Instruction on mindfulness meditation was provided.   Sleep: Clinical evidence does not support the need for a sleep study in this patient at this time.     Thank you for the opportunity to see this patient. Please feel free to contact [facility] if there are any questions, or if you feel that your patient would benefit from continued follow-up visits with the Program.

## 2018-03-29 NOTE — DISCHARGE NOTE ADULT - CARE PLAN
Principal Discharge DX:	Coronary artery disease  Goal:	please continue to take aspirin 81 mg daily, plavix 75 mg daily, metoprolol tartate 50 mg two times daily, lisinopril 20 mg daily, amlodipine 5 mg daily and atorvastatin 80 mg daily.  Assessment and plan of treatment:	You underwent a cardiac catheterization 3.28/18 and the blockage in your LAD (artery in your heart) was opened with stent placement.  NEVER MISS A DOSE OF ASPIRIN OR PLAVIX; IF YOU DO, YOU ARE AT RISK OF YOUR STENT CLOSING AND HAVING A HEART ATTACK. DO NOT STOP THESE TWO MEDICATIONS UNLESS INSTRUCTED TO DO SO BY YOUR CARDIOLOGIST  Secondary Diagnosis:	Hypertension  Goal:	please continue metoprolol tartate 50 mg two times daily, lisinopril 20 mg daily, amlodipine 5 mg daily  Secondary Diagnosis:	PVD (peripheral vascular disease)  Goal:	please continue taking cilostazol 50 mg two times daily.  Secondary Diagnosis:	Dyslipidemia  Goal:	please continue taking atorvastatin 80 mg daily.

## 2018-03-29 NOTE — PROVIDER CONTACT NOTE (OTHER) - ACTION/TREATMENT ORDERED:
RN called patient and primary emergency contact numbers, no answer. PA made aware. No actions ordered.

## 2018-03-29 NOTE — DISCHARGE NOTE ADULT - INSTRUCTIONS
•	Your procedure was done through your groin.  •	You do not need to keep this area covered and you may shower  •	Please avoid any heavy lifting  (no more than 3 to 5 lbs) or strenuous activity for five days  •	If you develop any swelling, bleeding, hardening of the skin (hematoma formation), acute pain, numbness/tingling  in your leg please contact your doctor immediately or call our 24/7 line: [phone]

## 2018-03-29 NOTE — DISCHARGE NOTE ADULT - PLAN OF CARE
please continue to take aspirin 81 mg daily, plavix 75 mg daily, metoprolol tartate 50 mg two times daily, lisinopril 20 mg daily, amlodipine 5 mg daily and atorvastatin 80 mg daily. You underwent a cardiac catheterization 3.28/18 and the blockage in your LAD (artery in your heart) was opened with stent placement.  NEVER MISS A DOSE OF ASPIRIN OR PLAVIX; IF YOU DO, YOU ARE AT RISK OF YOUR STENT CLOSING AND HAVING A HEART ATTACK. DO NOT STOP THESE TWO MEDICATIONS UNLESS INSTRUCTED TO DO SO BY YOUR CARDIOLOGIST please continue metoprolol tartate 50 mg two times daily, lisinopril 20 mg daily, amlodipine 5 mg daily please continue taking cilostazol 50 mg two times daily. please continue taking atorvastatin 80 mg daily.

## 2018-03-29 NOTE — CONSULT NOTE ADULT - CONSULT REASON
Prevention Quality 130: Documentation Of Current Medications In The Medical Record: Current Medications Documented Detail Level: Generalized Quality 226: Preventive Care And Screening: Tobacco Use: Screening And Cessation Intervention: Patient screened for tobacco and never smoked Quality 431: Preventive Care And Screening: Unhealthy Alcohol Use - Screening: Patient screened for unhealthy alcohol use using a single question and scores less than 2 times per year Quality 110: Preventive Care And Screening: Influenza Immunization: Influenza Immunization not Administered because Patient Refused. Quality 131: Pain Assessment And Follow-Up: Pain assessment documented as positive using a standardized tool AND a follow-up plan is documented

## 2018-03-29 NOTE — DISCHARGE NOTE ADULT - MEDICATION SUMMARY - MEDICATIONS TO TAKE
I will START or STAY ON the medications listed below when I get home from the hospital:    Aspirin Enteric Coated 81 mg oral delayed release tablet  -- 1 tab(s) by mouth once a day   -- Indication: For stent in your heart    nitroglycerin 0.4 mg sublingual tablet  -- 1 tab(s) under tongue every 5 minutes  -- Indication: For heart    allopurinol 100 mg oral tablet  -- 1 tab(s) by mouth once a day  -- Indication: For gout    atorvastatin 80 mg oral tablet  -- 1 tab(s) by mouth once a day (at bedtime)  -- Indication: For Cholesterol    amlodipine-benazepril 5 mg-20 mg oral capsule  -- 1 cap(s) by mouth once a day  -- Indication: For blood pressure    clopidogrel 75 mg oral tablet  -- 1 tab(s) by mouth once a day  -- Indication: For stent in your heart    Metoprolol Tartrate 50 mg oral tablet  -- 1 tab(s) by mouth 2 times a day  -- Indication: For blood pressure    cilostazol 50 mg oral tablet  -- 1 tab(s) by mouth 2 times a day  -- Indication: For  PVD    dorzolamide-timolol 2.23%-0.68% ophthalmic solution  -- 1 drop(s) to each affected eye 2 times a day  -- Indication: For eye drops

## 2018-04-02 DIAGNOSIS — H40.9 UNSPECIFIED GLAUCOMA: ICD-10-CM

## 2018-04-02 DIAGNOSIS — Z79.02 LONG TERM (CURRENT) USE OF ANTITHROMBOTICS/ANTIPLATELETS: ICD-10-CM

## 2018-04-02 DIAGNOSIS — I25.10 ATHEROSCLEROTIC HEART DISEASE OF NATIVE CORONARY ARTERY WITHOUT ANGINA PECTORIS: ICD-10-CM

## 2018-04-02 DIAGNOSIS — Z79.82 LONG TERM (CURRENT) USE OF ASPIRIN: ICD-10-CM

## 2018-04-02 DIAGNOSIS — I10 ESSENTIAL (PRIMARY) HYPERTENSION: ICD-10-CM

## 2018-04-02 DIAGNOSIS — Z95.820 PERIPHERAL VASCULAR ANGIOPLASTY STATUS WITH IMPLANTS AND GRAFTS: ICD-10-CM

## 2018-04-02 DIAGNOSIS — M10.9 GOUT, UNSPECIFIED: ICD-10-CM

## 2018-04-02 DIAGNOSIS — E78.5 HYPERLIPIDEMIA, UNSPECIFIED: ICD-10-CM

## 2018-04-02 DIAGNOSIS — Z95.5 PRESENCE OF CORONARY ANGIOPLASTY IMPLANT AND GRAFT: ICD-10-CM

## 2018-04-02 DIAGNOSIS — Z82.49 FAMILY HISTORY OF ISCHEMIC HEART DISEASE AND OTHER DISEASES OF THE CIRCULATORY SYSTEM: ICD-10-CM

## 2018-04-02 DIAGNOSIS — I73.9 PERIPHERAL VASCULAR DISEASE, UNSPECIFIED: ICD-10-CM

## 2018-04-02 DIAGNOSIS — H26.9 UNSPECIFIED CATARACT: ICD-10-CM

## 2018-08-03 NOTE — H&P ADULT - TEMPERATURE IN CELSIUS (DEGREES C)
pain controlled, tolerating oral intake -pain medication as prescribed as needed  -encourage oral intake 36.9

## 2022-02-03 NOTE — PATIENT PROFILE ADULT. - PRO ANTICIPATED DISCH DISP
Mini Guo is a 14year old male with type 1 diabetes mellitus who is managed on a tandem control IQ insulin pump with integrated Dexcom G6 CGM  Mini Guo continues to have some meal spikes likely due to some rounding with carbohydrates entered into the insulin pump  High correction is often not quite enough:   1  Hemoglobin A1c not able to be completed in office today  2  Yearly screening labs not due until Dec 2022  3  No change to insulin regimen, but call if you know you've done everything right and are experiencing spikes:  --Basal: (MN) 1 0  --Correction Factor: (MN) 1 to lower 50 mg/dL   --Carb (MN) 1 unit per 6 grams CHO (if blood sugars still spiking after meals with careful carb counting will adjust to 1 unit per 5 CHO)  --Target (MN) 110 mg/dL  4   Follow up in three months
home

## 2022-08-17 NOTE — H&P ADULT - NSHPROSALLOTHERNEGRD_GEN_ALL_CORE
All other review of systems negative, except as noted in HPI Carac Counseling:  I discussed with the patient the risks of Carac including but not limited to erythema, scaling, itching, weeping, crusting, and pain.
